# Patient Record
Sex: MALE | Race: WHITE | Employment: FULL TIME | URBAN - METROPOLITAN AREA
[De-identification: names, ages, dates, MRNs, and addresses within clinical notes are randomized per-mention and may not be internally consistent; named-entity substitution may affect disease eponyms.]

---

## 2023-03-30 ENCOUNTER — HOSPITAL ENCOUNTER (OUTPATIENT)
Dept: PHYSICAL THERAPY | Age: 68
Setting detail: RECURRING SERIES
End: 2023-03-30
Payer: COMMERCIAL

## 2023-03-30 PROCEDURE — 97110 THERAPEUTIC EXERCISES: CPT

## 2023-03-30 PROCEDURE — 97161 PT EVAL LOW COMPLEX 20 MIN: CPT

## 2023-03-30 NOTE — THERAPY EVALUATION
tenderness Rt   Glutes Increased tone and tenderness Rt glute med   Hamstrings WFL   PAIVM:   Lumbar Decreased mobility L2-5. No pain or reproduction of radicular anterior thigh and groin pain     ROM Date:  3/30/2023   LUMBAR ROM (TESTED IN STANDING)    RIGHT LEFT   Flexion 75 degrees  Pain and pulling in lower back with pain in Rt glute med region. --   Extension 10 degrees  \"Felt good\" --   Lateral Flexion 30 degrees 15 degrees  Pain and stretching in Rt QL region   Rotation 25% limited  Pain in Rt glute med, groin, and thigh region [facility]   HIP ROM   Flexion [facility]   Extension [facility] WFL   IR 25% limited and painful in groin region [facility]   ER  50% limited and painful WFL     STRENGTH   Date:   3/30/2023     Right Left   Hip Abduction 4-/5  Pain in Rt lower back/SI joint 4-/5  Pain in Rt    Hip IR 4/5 4/5   Hip ER 4/5 4/5   Hip Flexion 4/5  Pain in Rt lower back region 4/5   Knee Extension 4+/5 4+/5   Knee Flexion 4+/5 4+/5   Ankle DF 4+/5 4+/5   Ankle PF 4+/5 4+/5   Ankle IV 4+/5 4+/5   Ankle EV 4+/5 4+/5     SPECIAL TESTS: Assessed @ Initial Visit    -SCOUR: Positive Rt   -SI POST.  GAPPING: Negative   -LESLIE 4: Positive Rt for pain and tightness   -SUPINE TO SIT: Rt longer in sitting; neutral in sitting   -LUMBAR STENOSIS: NO      -Bilateral symptoms:       -Leg pain more than back pain:       -Pain during walking/standing:       -Pain relief upon sitting:       -Age greater than 48 years:      NEURAL TENSION TESTS   Date: 3/30/2023   SLR Negative B   SLUMP Negative B     -MYOTOMES Date: 3/30/2023     Right Left   L2 & L3   (Hip Flexors) 4/5 4/5   L3-L4  (Knee Extensors) 5/5 5/5   L4  (Ankle DFs) 5/5 5/5   L5  (Hallux Ext) 5/5 5/5   L5-S1  (Ankle PFs) 5/5 5/5   S1-S2  (Ankle EVs) 5/5 5/5     RED FLAGS: Date: 3/30/2023   Non-Mechanical pain distribution (cannot be produced, changed, or reduced during exam): NO   Cauda Equina Dysfunction: NO   Upper lumbar disc herniation in younger patients (femoral nerve tension test 06/28/23   Frequency/Duration: Plan Frequency: 2x a week for 90 days   Interventions Planned (Treatment may consist of any combination of the following):    Current Treatment Recommendations: Strengthening; ROM; Functional mobility training; ADL/Self-care training; Endurance training; Gait training; Neuromuscular re-education; Manual; Pain management; Return to work related activity; Home exercise program; Safety education & training; Patient/Caregiver education & training; Modalities; Dry needling     GOALS: (Goals have been discussed and agreed upon with patient.)  Short Term Goals 4 weeks   Princess Sotelo will be independent with HEP to promote self-management of symptoms. Princess Sotelo will participate in LE strengthening program with weights as appropriate to help with gait and elevations. Princess Sotelo will participate in static and dynamic balance activities to decrease the risk for falls and improve overall QOL. Princess Sotelo will tolerate manual therapy/joint mobilizations/soft tissue to increase ROM and decrease pain to improve functional mobility during ADLs. Discharge Goals 12 weeks  Princess Sotelo will demonstrate an 10 point improvement on the [facility] show improvement in function. Princess Sotelo will demonstrate >=4+/5 LE strength on manual muscle testing to improve functional mobility. Princess Sotelo will be able to demonstrate safe lifting and transfer mechanics without cueing for improved safety with home, childcare, and community activities. Outcome Measure: Tool Used: Modified Oswestry Low Back Pain Questionnaire  Score:  Initial: 32/50  Most Recent: X/50 (Date: -- )   Interpretation of Score: Each section is scored on a 0-5 scale, 5 representing the greatest disability. The scores of each section are added together for a total score of 50.       MEDICAL NECESSITY:  Skilled intervention continues to be required due to above deficits affecting participation in basic

## 2023-03-30 NOTE — PROGRESS NOTES
Renee Snyder  : 1955  Primary: [employer] (Worker's [employer])  Secondary:  [address] @ [address] [address]  Phone: [phone]  Fax: [phone] Plan Frequency: 2x a week for 90 days  Plan of Care/Certification Expiration Date: 23    >PT Visit Info:  Plan Frequency: 2x a week for 90 days  Plan of Care/Certification Expiration Date: 23  Total # of Visits Approved: 12    Visit Count:  1    OUTPATIENT PHYSICAL THERAPY:OP NOTE TYPE: Treatment Note 3/30/2023       Episode  }Appt Desk           Treatment Diagnosis: Low back pain (M54.5)  Muscle Weakness, Generalized (M62.81)  Abnormal posture (R29.3)  Pain in right hip (M25.551)  Medical/Referring Diagnosis:  Low back pain, unspecified [M54.50]  Referring Physician:  KARIN Fernando NP, MD Orders:  PT Eval and Treat  Date of Onset:  Onset Date: 23   Allergies:   Patient has no allergy information on record. Restrictions/Precautions:  Restrictions/Precautions: None  No data recorded   Interventions Planned (Treatment may consist of any combination of the following):    Current Treatment Recommendations: Strengthening; ROM; Functional mobility training; ADL/Self-care training; Endurance training; Gait training; Neuromuscular re-education; Manual; Pain management; Return to work related activity; Home exercise program; Safety education & training; Patient/Caregiver education & training; Modalities; Dry needling     >Subjective Comments: See eval     >Initial:      7/10>Post Session:        7/10  Medications Last Reviewed:  3/30/2023  Updated Objective Findings:  See evaluation note from today  Treatment   THERAPEUTIC EXERCISE: (10 minutes):  Exercises per grid below to improve mobility and strength. Required moderate visual, verbal, manual and tactile cues to promote proper body alignment, promote proper body posture and promote proper body mechanics.   Progressed resistance,

## 2023-04-13 ENCOUNTER — HOSPITAL ENCOUNTER (OUTPATIENT)
Dept: PHYSICAL THERAPY | Age: 68
Setting detail: RECURRING SERIES
Discharge: HOME | End: 2023-04-16
Payer: COMMERCIAL

## 2023-04-13 PROCEDURE — 97140 MANUAL THERAPY 1/> REGIONS: CPT

## 2023-04-13 PROCEDURE — 97110 THERAPEUTIC EXERCISES: CPT

## 2023-04-18 ENCOUNTER — HOSPITAL ENCOUNTER (OUTPATIENT)
Dept: PHYSICAL THERAPY | Age: 68
Setting detail: RECURRING SERIES
Discharge: HOME | End: 2023-04-21
Payer: COMMERCIAL

## 2023-04-18 PROCEDURE — 97110 THERAPEUTIC EXERCISES: CPT

## 2023-04-18 PROCEDURE — 97140 MANUAL THERAPY 1/> REGIONS: CPT

## 2023-04-18 NOTE — PROGRESS NOTES
Priscilla Braden  : 1955  Primary: [employer] (Worker's [employer])  Secondary:  [address] @ [address]  [address] [place] [address]  Phone: [phone]  Fax: [phone] Plan Frequency: 2x a week for 90 days  Plan of Care/Certification Expiration Date: 23      >PT Visit Info:  Plan Frequency: 2x a week for 90 days  Plan of Care/Certification Expiration Date: 23  Total # of Visits Approved: 12      Visit Count:  4    OUTPATIENT PHYSICAL THERAPY:OP NOTE TYPE: Treatment Note 2023       Episode  }Appt Desk           Treatment Diagnosis: Low back pain (M54.5)  Muscle Weakness, Generalized (M62.81)  Abnormal posture (R29.3)  Pain in right hip (M25.551)  Medical/Referring Diagnosis:  Low back pain, unspecified [M54.50]  Referring Physician:  KARIN Reyes NP, MD Orders:  PT Eval and Treat  Date of Onset:  Onset Date: 23     Allergies:   Patient has no allergy information on record. Restrictions/Precautions:  Restrictions/Precautions: None  No data recorded   Interventions Planned (Treatment may consist of any combination of the following):    Current Treatment Recommendations: Strengthening; ROM; Functional mobility training; ADL/Self-care training; Endurance training; Gait training; Neuromuscular re-education; Manual; Pain management; Return to work related activity; Home exercise program; Safety education & training; Patient/Caregiver education & training; Modalities; Dry needling     >Subjective Comments: Neptali Loud reports he feels the same. >Initial:      5/10>Post Session:        2/10  Medications Last Reviewed:  2023  Updated Objective Findings:  None Today  Treatment   THERAPEUTIC EXERCISE: (40 minutes):  Exercises per grid below to improve mobility and strength. Required moderate visual, verbal, manual and tactile cues to promote proper body alignment, promote proper body posture and promote proper body mechanics.   Progressed

## 2023-04-19 ENCOUNTER — HOSPITAL ENCOUNTER (OUTPATIENT)
Dept: PHYSICAL THERAPY | Age: 68
Setting detail: RECURRING SERIES
End: 2023-04-19
Payer: COMMERCIAL

## 2023-04-26 ENCOUNTER — HOSPITAL ENCOUNTER (OUTPATIENT)
Dept: PHYSICAL THERAPY | Age: 68
Setting detail: RECURRING SERIES
Discharge: HOME | End: 2023-04-29
Payer: COMMERCIAL

## 2023-04-26 PROCEDURE — 97140 MANUAL THERAPY 1/> REGIONS: CPT

## 2023-04-26 PROCEDURE — 97110 THERAPEUTIC EXERCISES: CPT

## 2023-04-26 NOTE — PROGRESS NOTES
Darren Balderrama  : 1955  Primary: [employer] (Worker's [employer])  Secondary:  [address] @ [address]  [address] [place], [place] [address]  Phone: [phone]  Fax: [phone] Plan Frequency: 2x a week for 90 days  Plan of Care/Certification Expiration Date: 23      >PT Visit Info:  Plan Frequency: 2x a week for 90 days  Plan of Care/Certification Expiration Date: 23  Total # of Visits Approved: 12      Visit Count:  5    OUTPATIENT PHYSICAL THERAPY:OP NOTE TYPE: Treatment Note 2023       Episode  }Appt Desk           Treatment Diagnosis: Low back pain (M54.5)  Muscle Weakness, Generalized (M62.81)  Abnormal posture (R29.3)  Pain in right hip (M25.551)  Medical/Referring Diagnosis:  Low back pain, unspecified [M54.50]  Referring Physician:  KARIN Toscano NP, MD Orders:  PT Eval and Treat  Date of Onset:  Onset Date: 23     Allergies:   Patient has no allergy information on record. Restrictions/Precautions:  Restrictions/Precautions: None  No data recorded   Interventions Planned (Treatment may consist of any combination of the following):    Current Treatment Recommendations: Strengthening; ROM; Functional mobility training; ADL/Self-care training; Endurance training; Gait training; Neuromuscular re-education; Manual; Pain management; Return to work related activity; Home exercise program; Safety education & training; Patient/Caregiver education & training; Modalities; Dry needling     >Subjective Comments: Chago Fong reports he \"forgot all about his appointments\" the past two visits. He says his back and R hip are both feeling better. >Initial:      2/10>Post Session:        1/10  Medications Last Reviewed:  2023  Updated Objective Findings:  None Today  Treatment   THERAPEUTIC EXERCISE: (40 minutes):  Exercises per grid below to improve mobility and strength.   Required moderate visual, verbal, manual and tactile cues to promote

## 2023-05-02 ENCOUNTER — HOSPITAL ENCOUNTER (OUTPATIENT)
Dept: PHYSICAL THERAPY | Age: 68
Setting detail: RECURRING SERIES
Discharge: HOME | End: 2023-05-05
Payer: COMMERCIAL

## 2023-05-02 PROCEDURE — 97110 THERAPEUTIC EXERCISES: CPT

## 2023-05-02 PROCEDURE — 97140 MANUAL THERAPY 1/> REGIONS: CPT

## 2023-05-02 NOTE — PROGRESS NOTES
Geraldean Angelucci  : 1955  Primary: [employer] (Worker's [employer])  Secondary:  [address] @ [address] [place] [address]  Phone: [phone]  Fax: [phone] Plan Frequency: 2x a week for 90 days  Plan of Care/Certification Expiration Date: 23      >PT Visit Info:  Plan Frequency: 2x a week for 90 days  Plan of Care/Certification Expiration Date: 23  Total # of Visits Approved: 12      Visit Count:  6    OUTPATIENT PHYSICAL THERAPY:OP NOTE TYPE: Treatment Note 2023       Episode  }Appt Desk           Treatment Diagnosis: Low back pain (M54.5)  Muscle Weakness, Generalized (M62.81)  Abnormal posture (R29.3)  Pain in right hip (M25.551)  Medical/Referring Diagnosis:  Low back pain, unspecified [M54.50]  Referring Physician:  KARIN Brasher NP, MD Orders:  PT Eval and Treat  Date of Onset:  Onset Date: 23     Allergies:   Patient has no allergy information on record. Restrictions/Precautions:  Restrictions/Precautions: None  No data recorded   Interventions Planned (Treatment may consist of any combination of the following):    Current Treatment Recommendations: Strengthening; ROM; Functional mobility training; ADL/Self-care training; Endurance training; Gait training; Neuromuscular re-education; Manual; Pain management; Return to work related activity; Home exercise program; Safety education & training; Patient/Caregiver education & training; Modalities; Dry needling     >Subjective Comments: Fe Frazier reports he couldn't walk for two days after last appt. \"We have to do exercises that aren't going to put stress on my back. \"     >Initial:      2/10>Post Session:        1/10  Medications Last Reviewed:  2023  Updated Objective Findings:  None Today  Treatment   THERAPEUTIC EXERCISE: (40 minutes):  Exercises per grid below to improve mobility and strength.   Required moderate visual, verbal, manual and tactile cues to promote proper body

## 2023-05-04 ENCOUNTER — HOSPITAL ENCOUNTER (OUTPATIENT)
Dept: PHYSICAL THERAPY | Age: 68
Setting detail: RECURRING SERIES
End: 2023-05-04
Payer: COMMERCIAL

## 2023-05-09 ENCOUNTER — HOSPITAL ENCOUNTER (OUTPATIENT)
Dept: PHYSICAL THERAPY | Age: 68
Setting detail: RECURRING SERIES
Discharge: HOME | End: 2023-05-12
Payer: COMMERCIAL

## 2023-05-09 PROCEDURE — 97110 THERAPEUTIC EXERCISES: CPT

## 2023-05-09 PROCEDURE — 97140 MANUAL THERAPY 1/> REGIONS: CPT
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
## 2023-05-11 ENCOUNTER — APPOINTMENT (OUTPATIENT)
Dept: PHYSICAL THERAPY | Age: 68
End: 2023-05-11
Payer: COMMERCIAL

## 2023-09-18 ENCOUNTER — CLINICAL DOCUMENTATION (OUTPATIENT)
Dept: PHYSICAL THERAPY | Age: 68
End: 2023-09-18

## 2023-09-18 NOTE — THERAPY DISCHARGE
[address] @ [address] [facility]  [address]  [address] [facility]  Phone: [phone]  Fax: [phone]    OUTPATIENT PHYSICAL THERAPY  Discontinuation Summary 9/18/2023  Episode  Appt Desk         Cisco Joy has been seen in physical therapy for 7  visits from 3/30/23 to 5/9/23, with 0 cancellations and 2 no shows.  Mr. Ashli Huang therapy has come to an end at this time due to: Patient did not return for/schedule additional treatment    Physical Therapy Goals:  Met: The patient has achieved the goals established in the plan of care and will be discharged to their home exercise program.    Elizabeth Morelos, PT

## 2023-09-27 ENCOUNTER — CLINICAL DOCUMENTATION (OUTPATIENT)
Dept: PHYSICAL THERAPY | Age: 68
End: 2023-09-27

## 2023-09-27 NOTE — PROGRESS NOTES
[address] @ [address] [facility]  [address]  [address] [facility]  Phone: [phone]  Fax: [phone]    OUTPATIENT PHYSICAL THERAPY  Discontinuation Summary 9/27/2023  Episode  Appt Desk         Jessi Yao has been seen in physical therapy for 7  visits from 3/30 to 5/9, with 0 cancellations and 2 no shows.  Mr. Shorty Lorenzo therapy has come to an end at this time due to: Patient did not return for/schedule additional treatment    Physical Therapy Goals:  Not Met: Reasons for goals not being achieved: unable to assess due to pt failing to return    Heather Joaquin PT

## 2025-08-19 ENCOUNTER — OFFICE VISIT (OUTPATIENT)
Dept: FAMILY MEDICINE CLINIC | Facility: CLINIC | Age: 70
End: 2025-08-19
Payer: COMMERCIAL

## 2025-08-19 VITALS
SYSTOLIC BLOOD PRESSURE: 128 MMHG | BODY MASS INDEX: 28.63 KG/M2 | OXYGEN SATURATION: 96 % | HEIGHT: 70 IN | DIASTOLIC BLOOD PRESSURE: 80 MMHG | WEIGHT: 200 LBS | HEART RATE: 69 BPM | TEMPERATURE: 98.3 F

## 2025-08-19 DIAGNOSIS — Z12.5 PROSTATE CANCER SCREENING: ICD-10-CM

## 2025-08-19 DIAGNOSIS — Z00.00 ADULT WELLNESS VISIT: ICD-10-CM

## 2025-08-19 DIAGNOSIS — Z76.89 ENCOUNTER TO ESTABLISH CARE WITH NEW DOCTOR: Primary | ICD-10-CM

## 2025-08-19 DIAGNOSIS — Z76.89 ENCOUNTER TO ESTABLISH CARE WITH NEW DOCTOR: ICD-10-CM

## 2025-08-19 DIAGNOSIS — Z12.11 COLON CANCER SCREENING: ICD-10-CM

## 2025-08-19 DIAGNOSIS — Z85.46 PERSONAL HISTORY OF PROSTATE CANCER: ICD-10-CM

## 2025-08-19 LAB
ALBUMIN SERPL-MCNC: 3.8 G/DL (ref 3.2–4.6)
ALBUMIN/GLOB SERPL: 1.2 (ref 1–1.9)
ALP SERPL-CCNC: 93 U/L (ref 40–129)
ALT SERPL-CCNC: 27 U/L (ref 8–55)
ANION GAP SERPL CALC-SCNC: 11 MMOL/L (ref 7–16)
AST SERPL-CCNC: 19 U/L (ref 15–37)
BASOPHILS # BLD: 0.03 K/UL (ref 0–0.2)
BASOPHILS NFR BLD: 0.5 % (ref 0–2)
BILIRUB SERPL-MCNC: 0.7 MG/DL (ref 0–1.2)
BUN SERPL-MCNC: 15 MG/DL (ref 8–23)
CALCIUM SERPL-MCNC: 10 MG/DL (ref 8.8–10.2)
CHLORIDE SERPL-SCNC: 102 MMOL/L (ref 98–107)
CHOLEST SERPL-MCNC: 239 MG/DL (ref 0–200)
CO2 SERPL-SCNC: 25 MMOL/L (ref 20–29)
CREAT SERPL-MCNC: 0.74 MG/DL (ref 0.8–1.3)
DIFFERENTIAL METHOD BLD: NORMAL
EOSINOPHIL # BLD: 0.05 K/UL (ref 0–0.8)
EOSINOPHIL NFR BLD: 0.8 % (ref 0.5–7.8)
ERYTHROCYTE [DISTWIDTH] IN BLOOD BY AUTOMATED COUNT: 13.7 % (ref 11.9–14.6)
GLOBULIN SER CALC-MCNC: 3.1 G/DL (ref 2.3–3.5)
GLUCOSE SERPL-MCNC: 99 MG/DL (ref 70–99)
HCT VFR BLD AUTO: 47.8 % (ref 41.1–50.3)
HDLC SERPL-MCNC: 60 MG/DL (ref 40–60)
HDLC SERPL: 4 (ref 0–5)
HGB BLD-MCNC: 15.7 G/DL (ref 13.6–17.2)
IMM GRANULOCYTES # BLD AUTO: 0.04 K/UL (ref 0–0.5)
IMM GRANULOCYTES NFR BLD AUTO: 0.7 % (ref 0–5)
LDLC SERPL CALC-MCNC: 128 MG/DL (ref 0–100)
LYMPHOCYTES # BLD: 1.11 K/UL (ref 0.5–4.6)
LYMPHOCYTES NFR BLD: 18.7 % (ref 13–44)
MCH RBC QN AUTO: 29.7 PG (ref 26.1–32.9)
MCHC RBC AUTO-ENTMCNC: 32.8 G/DL (ref 31.4–35)
MCV RBC AUTO: 90.4 FL (ref 82–102)
MONOCYTES # BLD: 0.67 K/UL (ref 0.1–1.3)
MONOCYTES NFR BLD: 11.3 % (ref 4–12)
NEUTS SEG # BLD: 4.03 K/UL (ref 1.7–8.2)
NEUTS SEG NFR BLD: 68 % (ref 43–78)
NRBC # BLD: 0 K/UL (ref 0–0.2)
PLATELET # BLD AUTO: 246 K/UL (ref 150–450)
PMV BLD AUTO: 10.6 FL (ref 9.4–12.3)
POTASSIUM SERPL-SCNC: 4.8 MMOL/L (ref 3.5–5.1)
PROT SERPL-MCNC: 6.9 G/DL (ref 6.3–8.2)
PSA FREE MFR SERPL: NORMAL %
PSA FREE SERPL-MCNC: <0.1 NG/ML
PSA SERPL-MCNC: 0.4 NG/ML (ref 0–4)
RBC # BLD AUTO: 5.29 M/UL (ref 4.23–5.6)
SODIUM SERPL-SCNC: 139 MMOL/L (ref 136–145)
TRIGL SERPL-MCNC: 259 MG/DL (ref 0–150)
TSH W FREE THYROID IF ABNORMAL: 2.26 UIU/ML (ref 0.27–4.2)
VLDLC SERPL CALC-MCNC: 52 MG/DL (ref 6–23)
WBC # BLD AUTO: 5.9 K/UL (ref 4.3–11.1)

## 2025-08-19 PROCEDURE — 99387 INIT PM E/M NEW PAT 65+ YRS: CPT | Performed by: STUDENT IN AN ORGANIZED HEALTH CARE EDUCATION/TRAINING PROGRAM

## 2025-08-19 SDOH — ECONOMIC STABILITY: FOOD INSECURITY: WITHIN THE PAST 12 MONTHS, YOU WORRIED THAT YOUR FOOD WOULD RUN OUT BEFORE YOU GOT MONEY TO BUY MORE.: NEVER TRUE

## 2025-08-19 SDOH — ECONOMIC STABILITY: FOOD INSECURITY: WITHIN THE PAST 12 MONTHS, THE FOOD YOU BOUGHT JUST DIDN'T LAST AND YOU DIDN'T HAVE MONEY TO GET MORE.: NEVER TRUE

## 2025-08-19 ASSESSMENT — PATIENT HEALTH QUESTIONNAIRE - PHQ9
SUM OF ALL RESPONSES TO PHQ QUESTIONS 1-9: 0
1. LITTLE INTEREST OR PLEASURE IN DOING THINGS: NOT AT ALL
SUM OF ALL RESPONSES TO PHQ QUESTIONS 1-9: 0
SUM OF ALL RESPONSES TO PHQ QUESTIONS 1-9: 0
2. FEELING DOWN, DEPRESSED OR HOPELESS: NOT AT ALL
SUM OF ALL RESPONSES TO PHQ QUESTIONS 1-9: 0

## 2025-08-19 ASSESSMENT — ENCOUNTER SYMPTOMS
ABDOMINAL PAIN: 0
NAUSEA: 0
VOMITING: 0
SINUS PAIN: 0
BLOOD IN STOOL: 0
DIARRHEA: 0
EYE DISCHARGE: 0
WHEEZING: 0
SINUS PRESSURE: 0
SORE THROAT: 0
CHEST TIGHTNESS: 0
SHORTNESS OF BREATH: 0

## 2025-09-04 LAB — NONINV COLON CA DNA+OCC BLD SCRN STL QL: NEGATIVE
